# Patient Record
Sex: FEMALE | Race: BLACK OR AFRICAN AMERICAN | Employment: UNEMPLOYED | URBAN - METROPOLITAN AREA
[De-identification: names, ages, dates, MRNs, and addresses within clinical notes are randomized per-mention and may not be internally consistent; named-entity substitution may affect disease eponyms.]

---

## 2017-08-25 ENCOUNTER — TELEPHONE (OUTPATIENT)
Dept: ENDOCRINOLOGY | Facility: CLINIC | Age: 52
End: 2017-08-25

## 2017-08-25 NOTE — TELEPHONE ENCOUNTER
Received refill request for topiramate (TOPAMAX) 25 MG tablet   . Patient needs appointment scheduled prior to any refills. Clinic RN Care Coordinator notified and will follow up with the patient as appropriate. The pharmacy has been notified that the medication will not be refilled prior to an appointment being scheduled.    [facility]  #[id number]  [place]

## 2018-08-10 ENCOUNTER — EXTERNAL ORDER RESULTS (OUTPATIENT)
Dept: CARE COORDINATION | Facility: CLINIC | Age: 53
End: 2018-08-10

## 2018-12-03 ENCOUNTER — OFFICE VISIT (OUTPATIENT)
Dept: ENDOCRINOLOGY | Facility: CLINIC | Age: 53
End: 2018-12-03
Payer: MEDICARE

## 2018-12-03 VITALS
TEMPERATURE: 98.7 F | HEART RATE: 96 BPM | WEIGHT: 238.2 LBS | HEIGHT: 63 IN | OXYGEN SATURATION: 95 % | DIASTOLIC BLOOD PRESSURE: 98 MMHG | BODY MASS INDEX: 42.21 KG/M2 | SYSTOLIC BLOOD PRESSURE: 154 MMHG | RESPIRATION RATE: 18 BRPM

## 2018-12-03 DIAGNOSIS — E66.01 MORBID OBESITY DUE TO EXCESS CALORIES (H): ICD-10-CM

## 2018-12-03 RX ORDER — PHENTERMINE HYDROCHLORIDE 15 MG/1
15 CAPSULE ORAL EVERY MORNING
Qty: 30 CAPSULE | Refills: 5 | Status: SHIPPED | OUTPATIENT
Start: 2018-12-03 | End: 2020-11-02

## 2018-12-03 RX ORDER — TOPIRAMATE 25 MG/1
TABLET, FILM COATED ORAL
Qty: 90 TABLET | Refills: 5 | Status: SHIPPED | OUTPATIENT
Start: 2018-12-03 | End: 2020-11-02

## 2018-12-03 NOTE — PROGRESS NOTES
"    Return Medical Weight Management Note     Hillary Arceo  MRN:  [id number]  :  1965  FELIBERTO:  2015    Dear Dr. Hewitt,     I had the pleasure of seeing your patient Hillary Arceo.  She is a 53 year old female who I am continuing to see for treatment of obesity related to:DM-2, Hypertension, Hyperlipidemia, Sleep Apnea (has CPAP and does not use), Back Pain, Knee Pain and Depression.    CURRENT WEIGHT:   238 lbs 3.2 oz    Wt Readings from Last 4 Encounters:   18 108 kg (238 lb 3.2 oz)   12/18/15 112.4 kg (247 lb 14.4 oz)   14 110.5 kg (243 lb 11.2 oz)   10/23/14 99.3 kg (219 lb)       Height:  5' 3\"  Body Mass Index:  Body mass index is 42.2 kg/(m^2).  Vitals:  B/P: 121/80, P: 98, R: 19     Initial consult weight was 240 on 14.  Weight change since last seen on 12/18/15 is down 9 pounds.   Total loss is 2 pounds.    INTERVAL HISTORY:  Has been off meds and food program for 2+ years, wants to restart.    Diet and Activity Changes Since Last Visit Reviewed With Patient 12/3/2018   I have made the following changes to my diet since my last visit: I DO NOT EAT ANY PORK   With regards to my diet, I am still struggling with: SOME POTATO CHIPS   For breakfast, I typically eat: -   For lunch, I typically eat: -   For supper, I typically eat: -   For snack(s), I typically eat: -   I have made the following changes to my activity/exercise since my last visit: I WALK AT LEAST 2 MILES 3 TIMES A WEEK   With regards to my activity/exercise, I am still struggling with: STAYING FOCUSED ON WALKING AND EXERCISING       ROS    MEDICATIONS:   Current Outpatient Prescriptions   Medication     albuterol (PROAIR HFA, PROVENTIL HFA, VENTOLIN HFA) 108 (90 BASE) MCG/ACT inhaler     ATORVASTATIN CALCIUM PO     benzonatate (TESSALON) 100 MG capsule     Cyclobenzaprine HCl (FLEXERIL PO)     FLUoxetine HCl (PROZAC PO)     gabapentin enacarbil (HORIZANT) 600 MG tablet     GLIMEPIRIDE PO     " lisinopril-hydrochlorothiazide (PRINZIDE,ZESTORETIC) 20-12.5 MG per tablet     metFORMIN (GLUCOPHAGE) 500 MG/5ML SOLN     METHADONE HCL PO     phentermine (ADIPEX-P) 37.5 MG tablet     topiramate (TOPAMAX) 25 MG tablet     No current facility-administered medications for this visit.        Weight Loss Medication History Reviewed With Patient 12/3/2018   Which weight loss medications are you currently taking on a regular basis?  None   If you are not taking a weight loss medication that was prescribed to you, please indicate why: It did not seem to be helping me   Are you having any side effects from the weight loss medication that we have prescribed you? No   If you are having side effects please describe: NONE     Obesity Medications: Phentermine. Topiramate    Side-effects: None.Blood pressure and cardiac status are acceptable.    Assessment/plan: Reinforce food goals, restart phentermine 15/d, restart topiramate ramp up to 75 HS.     RTC:    12 weeks.  10/15 minutes spent on counseling and education     Sincerely,    Amador Gooden MD

## 2018-12-03 NOTE — NURSING NOTE
"Chief Complaint   Patient presents with     Weight Problem     pt here for weight management follow up       Vitals:    12/03/18 0831 12/03/18 0836   BP: (!) 176/106 (!) 154/98   BP Location: Left arm    Patient Position: Sitting    Cuff Size: Adult Regular    Pulse: 96    Resp: 18    Temp: 98.7  F (37.1  C)    TempSrc: Oral    SpO2: 95%    Weight: 238 lb 3.2 oz    Height: 5' 3\"        Body mass index is 42.2 kg/(m^2).      AUSTEN Cordero, EMT                      "

## 2018-12-03 NOTE — MR AVS SNAPSHOT
"              After Visit Summary   12/3/2018    Hillary Arceo    MRN: [id number]           Patient Information     Date Of Birth          1965        Visit Information        Provider Department      12/3/2018 8:15 AM Amador Gooden MD  Health Medical Weight Management        Today's Diagnoses     Morbid obesity due to excess calories (H)           Follow-ups after your visit        Follow-up notes from your care team     Return in about 4 months (around 4/3/2019).      Who to contact     Please call your clinic at [phone] to:    Ask questions about your health    Make or cancel appointments    Discuss your medicines    Learn about your test results    Speak to your doctor            Additional Information About Your Visit        MyChart Information     [employer] is an electronic gateway that provides easy, online access to your medical records. With [facility], you can request a clinic appointment, read your test results, renew a prescription or communicate with your care team.     To sign up for [employer] visit the website at [email or web address]   You will be asked to enter the access code listed below, as well as some personal information. Please follow the directions to create your username and password.     Your access code is: [email or web address]  Expires: 2019  6:30 AM     Your access code will  in 90 days. If you need help or a new code, please contact your [facility] or call [phone] for assistance.        Care EveryWhere ID     This is your Care EveryWhere ID. This could be used by other organizations to access your [facility] medical records  [id number]        Your Vitals Were     Pulse Temperature Respirations Height Pulse Oximetry BMI (Body Mass Index)    96 98.7  F (37.1  C) (Oral) 18 1.6 m (5' 3\") 95% 42.2 kg/m2       Blood Pressure from Last 3 Encounters:   18 (!) 154/98   12/18/15 121/80   14 119/79    Weight from Last 3 " Encounters:   12/03/18 108 kg (238 lb 3.2 oz)   12/18/15 112.4 kg (247 lb 14.4 oz)   12/12/14 110.5 kg (243 lb 11.2 oz)              Today, you had the following     No orders found for display         Today's Medication Changes          These changes are accurate as of 12/3/18  8:58 AM.  If you have any questions, ask your nurse or doctor.               Start taking these medicines.        Dose/Directions    phentermine 15 MG capsule   Commonly known as:  ADIPEX-P   Used for:  Morbid obesity due to excess calories (H)   Replaces:  phentermine 37.5 MG tablet   Started by:  Amador Gooden MD        Dose:  15 mg   Take 1 capsule (15 mg) by mouth every morning   Quantity:  30 capsule   Refills:  5         These medicines have changed or have updated prescriptions.        Dose/Directions    topiramate 25 MG tablet   Commonly known as:  TOPAMAX   This may have changed:    - how much to take  - how to take this  - when to take this  - additional instructions   Used for:  Morbid obesity due to excess calories (H)   Changed by:  Amador Gooden MD        25 mg at bedtime for 1 week, 50 mg at bedtime for 1 week and 75 mg daily at bedtime thereafter   Quantity:  90 tablet   Refills:  5         Stop taking these medicines if you haven't already. Please contact your care team if you have questions.     phentermine 37.5 MG tablet   Commonly known as:  ADIPEX-P   Replaced by:  phentermine 15 MG capsule   Stopped by:  Amador Gooden MD                Where to get your medicines      These medications were sent to [facility] #[id number] - [place] - [address] [place] [address] [place] [address]     Phone:  [phone]     topiramate 25 MG tablet         Some of these will need a paper prescription and others can be bought over the counter.  Ask your nurse if you have questions.     Bring a paper prescription for each of these medications     phentermine 15 MG capsule                Primary  Care Provider Office Phone # Fax #    Anjali Hewitt MD, [phone]       [facility] [address]  [facility] [address]        Equal Access to Services     DAVID WARE : Anam patel fidel cheyanneo Sobryan, waaxda luqadaha, qaybta kaalmada adeegyada, el franco laLilliamfrench espinal. So [facility] [phone].    ATENCIÓN: Si habla español, tiene a levy disposición servicios gratuitos de asistencia lingüística. Llame al [phone].    We comply with applicable federal civil rights laws and [place] laws. We do not discriminate on the basis of race, color, national origin, age, disability, sex, sexual orientation, or gender identity.            Thank you!     Thank you for choosing [facility]  for your care. Our goal is always to provide you with excellent care. Hearing back from our patients is one way we can continue to improve our services. Please take a few minutes to complete the written survey that you may receive in the mail after your visit with us. Thank you!             Your Updated Medication List - Protect others around you: Learn how to safely use, store and throw away your medicines at www.disposemymeds.org.          This list is accurate as of 12/3/18  8:58 AM.  Always use your most recent med list.                   Brand Name Dispense Instructions for use Diagnosis    albuterol 108 (90 Base) MCG/ACT inhaler    PROAIR HFA/PROVENTIL HFA/VENTOLIN HFA     Inhale 2 puffs into the lungs every 6 hours        ATORVASTATIN CALCIUM PO      Take 20 mg by mouth daily        benzonatate 100 MG capsule    TESSALON    25 capsule    Take 1 capsule (100 mg) by mouth 3 times daily as needed for cough        FLEXERIL PO      Take 10 mg by mouth 3 times daily        gabapentin enacarbil 600 MG tablet    HORIZANT     Take 600 mg by mouth 3 times daily Do not split, crush or chew tablets.        GLIMEPIRIDE PO      Take 4 mg by mouth daily         lisinopril-hydrochlorothiazide 20-12.5 MG tablet    PRINZIDE/ZESTORETIC     Take 1 tablet by mouth daily        metFORMIN 500 MG/5ML Soln solution    GLUCOPHAGE     Take 1,000 mg by mouth 2 times daily        METHADONE HCL PO      Take 48 mg by mouth        phentermine 15 MG capsule    ADIPEX-P    30 capsule    Take 1 capsule (15 mg) by mouth every morning    Morbid obesity due to excess calories (H)       PROZAC PO      Take 40 mg by mouth daily        topiramate 25 MG tablet    TOPAMAX    90 tablet    25 mg at bedtime for 1 week, 50 mg at bedtime for 1 week and 75 mg daily at bedtime thereafter    Morbid obesity due to excess calories (H)

## 2018-12-03 NOTE — LETTER
"12/3/2018       RE: Hillary Arceo  [address]  [place] [address]     Dear Colleague,    Thank you for referring your patient, Hillary Arceo, to the [facility] at [facility]. Please see a copy of my visit note below.        Return Medical Weight Management Note     Hillary Arceo  MRN:  [id number]  :  1965  FELIBERTO:  2015    Dear Dr. Hewitt,     I had the pleasure of seeing your patient Hillary Arceo.  She is a 53 year old female who I am continuing to see for treatment of obesity related to:DM-2, Hypertension, Hyperlipidemia, Sleep Apnea (has CPAP and does not use), Back Pain, Knee Pain and Depression.    CURRENT WEIGHT:   238 lbs 3.2 oz    Wt Readings from Last 4 Encounters:   18 108 kg (238 lb 3.2 oz)   12/18/15 112.4 kg (247 lb 14.4 oz)   14 110.5 kg (243 lb 11.2 oz)   10/23/14 99.3 kg (219 lb)       Height:  5' 3\"  Body Mass Index:  Body mass index is 42.2 kg/(m^2).  Vitals:  B/P: 121/80, P: 98, R: 19     Initial consult weight was 240 on 14.  Weight change since last seen on 12/18/15 is down 9 pounds.   Total loss is 2 pounds.    INTERVAL HISTORY:  Has been off meds and food program for 2+ years, wants to restart.    Diet and Activity Changes Since Last Visit Reviewed With Patient 12/3/2018   I have made the following changes to my diet since my last visit: I DO NOT EAT ANY PORK   With regards to my diet, I am still struggling with: SOME POTATO CHIPS   For breakfast, I typically eat: -   For lunch, I typically eat: -   For supper, I typically eat: -   For snack(s), I typically eat: -   I have made the following changes to my activity/exercise since my last visit: I WALK AT LEAST 2 MILES 3 TIMES A WEEK   With regards to my activity/exercise, I am still struggling with: STAYING FOCUSED ON WALKING AND EXERCISING       ROS    MEDICATIONS:   Current Outpatient Prescriptions   Medication     albuterol (PROAIR HFA, " PROVENTIL HFA, VENTOLIN HFA) 108 (90 BASE) MCG/ACT inhaler     ATORVASTATIN CALCIUM PO     benzonatate (TESSALON) 100 MG capsule     Cyclobenzaprine HCl (FLEXERIL PO)     FLUoxetine HCl (PROZAC PO)     gabapentin enacarbil (HORIZANT) 600 MG tablet     GLIMEPIRIDE PO     lisinopril-hydrochlorothiazide (PRINZIDE,ZESTORETIC) 20-12.5 MG per tablet     metFORMIN (GLUCOPHAGE) 500 MG/5ML SOLN     METHADONE HCL PO     phentermine (ADIPEX-P) 37.5 MG tablet     topiramate (TOPAMAX) 25 MG tablet     No current facility-administered medications for this visit.        Weight Loss Medication History Reviewed With Patient 12/3/2018   Which weight loss medications are you currently taking on a regular basis?  None   If you are not taking a weight loss medication that was prescribed to you, please indicate why: It did not seem to be helping me   Are you having any side effects from the weight loss medication that we have prescribed you? No   If you are having side effects please describe: NONE     Obesity Medications: Phentermine. Topiramate    Side-effects: None.Blood pressure and cardiac status are acceptable.    Assessment/plan: Reinforce food goals, restart phentermine 15/d, restart topiramate ramp up to 75 HS.     RTC:    12 weeks.  10/15 minutes spent on counseling and education         Amador Gooden MD

## 2019-03-05 DIAGNOSIS — E66.01 MORBID OBESITY DUE TO EXCESS CALORIES (H): ICD-10-CM

## 2019-03-06 RX ORDER — TOPIRAMATE 25 MG/1
TABLET, FILM COATED ORAL
Qty: 218 TABLET | Refills: 4 | OUTPATIENT
Start: 2019-03-06

## 2019-11-01 ENCOUNTER — RECORDS - HEALTHEAST (OUTPATIENT)
Dept: LAB | Facility: CLINIC | Age: 54
End: 2019-11-01

## 2019-11-04 LAB
ANION GAP SERPL CALCULATED.3IONS-SCNC: 8 MMOL/L (ref 5–18)
BUN SERPL-MCNC: 14 MG/DL (ref 8–22)
CALCIUM SERPL-MCNC: 9.7 MG/DL (ref 8.5–10.5)
CHLORIDE BLD-SCNC: 104 MMOL/L (ref 98–107)
CO2 SERPL-SCNC: 29 MMOL/L (ref 22–31)
CREAT SERPL-MCNC: 0.7 MG/DL (ref 0.6–1.1)
ERYTHROCYTE [DISTWIDTH] IN BLOOD BY AUTOMATED COUNT: 13.8 % (ref 11–14.5)
GAMMA INTERFERON BACKGROUND BLD IA-ACNC: 0.06 IU/ML
GFR SERPL CREATININE-BSD FRML MDRD: >60 ML/MIN/1.73M2
GLUCOSE BLD-MCNC: 73 MG/DL (ref 70–125)
HCT VFR BLD AUTO: 34.5 % (ref 35–47)
HGB BLD-MCNC: 10.5 G/DL (ref 12–16)
M TB IFN-G BLD-IMP: NEGATIVE
MCH RBC QN AUTO: 26.7 PG (ref 27–34)
MCHC RBC AUTO-ENTMCNC: 30.4 G/DL (ref 32–36)
MCV RBC AUTO: 88 FL (ref 80–100)
MITOGEN IGNF BCKGRD COR BLD-ACNC: 0 IU/ML
MITOGEN IGNF BCKGRD COR BLD-ACNC: 0 IU/ML
PLATELET # BLD AUTO: 255 THOU/UL (ref 140–440)
PMV BLD AUTO: 10 FL (ref 8.5–12.5)
POTASSIUM BLD-SCNC: 3.5 MMOL/L (ref 3.5–5)
QTF INTERPRETATION: NORMAL
QTF MITOGEN - NIL: 9.78 IU/ML
RBC # BLD AUTO: 3.93 MILL/UL (ref 3.8–5.4)
SODIUM SERPL-SCNC: 141 MMOL/L (ref 136–145)
WBC: 6 THOU/UL (ref 4–11)

## 2019-11-24 ENCOUNTER — RECORDS - HEALTHEAST (OUTPATIENT)
Dept: LAB | Facility: CLINIC | Age: 54
End: 2019-11-24

## 2019-11-25 LAB
ANION GAP SERPL CALCULATED.3IONS-SCNC: 9 MMOL/L (ref 5–18)
BUN SERPL-MCNC: 14 MG/DL (ref 8–22)
CALCIUM SERPL-MCNC: 9.5 MG/DL (ref 8.5–10.5)
CHLORIDE BLD-SCNC: 104 MMOL/L (ref 98–107)
CO2 SERPL-SCNC: 29 MMOL/L (ref 22–31)
CREAT SERPL-MCNC: 0.71 MG/DL (ref 0.6–1.1)
ERYTHROCYTE [DISTWIDTH] IN BLOOD BY AUTOMATED COUNT: 13.4 % (ref 11–14.5)
GFR SERPL CREATININE-BSD FRML MDRD: >60 ML/MIN/1.73M2
GLUCOSE BLD-MCNC: 77 MG/DL (ref 70–125)
HCT VFR BLD AUTO: 35 % (ref 35–47)
HGB BLD-MCNC: 10.7 G/DL (ref 12–16)
MCH RBC QN AUTO: 26.6 PG (ref 27–34)
MCHC RBC AUTO-ENTMCNC: 30.6 G/DL (ref 32–36)
MCV RBC AUTO: 87 FL (ref 80–100)
PLATELET # BLD AUTO: 280 THOU/UL (ref 140–440)
PMV BLD AUTO: 10 FL (ref 8.5–12.5)
POTASSIUM BLD-SCNC: 3.7 MMOL/L (ref 3.5–5)
RBC # BLD AUTO: 4.03 MILL/UL (ref 3.8–5.4)
SODIUM SERPL-SCNC: 142 MMOL/L (ref 136–145)
WBC: 5.7 THOU/UL (ref 4–11)

## 2019-12-19 ENCOUNTER — RECORDS - HEALTHEAST (OUTPATIENT)
Dept: LAB | Facility: CLINIC | Age: 54
End: 2019-12-19

## 2019-12-20 LAB
ANION GAP SERPL CALCULATED.3IONS-SCNC: 6 MMOL/L (ref 5–18)
BUN SERPL-MCNC: 19 MG/DL (ref 8–22)
CALCIUM SERPL-MCNC: 10.1 MG/DL (ref 8.5–10.5)
CHLORIDE BLD-SCNC: 101 MMOL/L (ref 98–107)
CO2 SERPL-SCNC: 33 MMOL/L (ref 22–31)
CREAT SERPL-MCNC: 0.74 MG/DL (ref 0.6–1.1)
GFR SERPL CREATININE-BSD FRML MDRD: >60 ML/MIN/1.73M2
GLUCOSE BLD-MCNC: 73 MG/DL (ref 70–125)
POTASSIUM BLD-SCNC: 4 MMOL/L (ref 3.5–5)
SODIUM SERPL-SCNC: 140 MMOL/L (ref 136–145)

## 2020-10-24 DIAGNOSIS — E66.01 MORBID OBESITY DUE TO EXCESS CALORIES (H): ICD-10-CM

## 2020-10-28 ENCOUNTER — TELEPHONE (OUTPATIENT)
Dept: ENDOCRINOLOGY | Facility: CLINIC | Age: 55
End: 2020-10-28

## 2020-10-28 RX ORDER — TOPIRAMATE 25 MG/1
TABLET, FILM COATED ORAL
Qty: 90 TABLET | Refills: 5 | OUTPATIENT
Start: 2020-10-28

## 2020-10-28 NOTE — TELEPHONE ENCOUNTER
Refill request from pharmacy for Topiramate. Refill denied. Patient has not been seen since 2018. Message to clinic coordinators to schedule patient for virtual visit with Dr. Gooden to discuss medications.

## 2020-11-02 ENCOUNTER — VIRTUAL VISIT (OUTPATIENT)
Dept: ENDOCRINOLOGY | Facility: CLINIC | Age: 55
End: 2020-11-02
Payer: COMMERCIAL

## 2020-11-02 VITALS — WEIGHT: 232 LBS | BODY MASS INDEX: 41.11 KG/M2 | HEIGHT: 63 IN

## 2020-11-02 DIAGNOSIS — E66.01 MORBID OBESITY DUE TO EXCESS CALORIES (H): ICD-10-CM

## 2020-11-02 DIAGNOSIS — E66.01 MORBID OBESITY (H): ICD-10-CM

## 2020-11-02 PROCEDURE — 99213 OFFICE O/P EST LOW 20 MIN: CPT | Mod: 95 | Performed by: INTERNAL MEDICINE

## 2020-11-02 RX ORDER — TOPIRAMATE 25 MG/1
TABLET, FILM COATED ORAL
Qty: 90 TABLET | Refills: 5 | Status: SHIPPED | OUTPATIENT
Start: 2020-11-02 | End: 2020-11-06

## 2020-11-02 RX ORDER — PHENTERMINE HYDROCHLORIDE 15 MG/1
15 CAPSULE ORAL EVERY MORNING
Qty: 30 CAPSULE | Refills: 5 | Status: SHIPPED | OUTPATIENT
Start: 2020-11-02 | End: 2021-04-12

## 2020-11-02 ASSESSMENT — MIFFLIN-ST. JEOR: SCORE: 1616.48

## 2020-11-02 ASSESSMENT — PAIN SCALES - GENERAL: PAINLEVEL: EXTREME PAIN (9)

## 2020-11-02 NOTE — PROGRESS NOTES
"Hillary Arceo is a 55 year old female who is being evaluated via a billable video visit.      The patient has been notified of following:     \"This video visit will be conducted via a call between you and your physician/provider. We have found that certain health care needs can be provided without the need for an in-person physical exam.  This service lets us provide the care you need with a video conversation.  If a prescription is necessary we can send it directly to your pharmacy.  If lab work is needed we can place an order for that and you can then stop by our lab to have the test done at a later time.    Video visits are billed at different rates depending on your insurance coverage.  Please reach out to your insurance provider with any questions.    If during the course of the call the physician/provider feels a video visit is not appropriate, you will not be charged for this service.\"    Patient has given verbal consent for Video visit? Yes  How would you like to obtain your AVS? MyChart  If you are dropped from the video visit, the video invite should be resent to: Text to cell phone: [phone]  Will anyone else be joining your video visit? No  {If patient encounters technical issues they should call [phone] :[id number]}    During this virtual visit the patient is located in [place], patient verifies this as the location during the entirety of this visit.     Video-Visit Details    Type of service:  Video Visit    Video Start Time: {video visit start/end time:152948}  Video End Time: {video visit start/end time:152948}    Originating Location (pt. Location): {video visit patient location:933620::\"Home\"}    Distant Location (provider location):  [facility] [place]     Platform used for Video Visit: {Virtual Visit Platforms:602712::\"Fantoo\"}    {signature options:760202}      "

## 2020-11-02 NOTE — NURSING NOTE
"Chief Complaint   Patient presents with     Follow Up     [facility] follow up       Vitals:    11/02/20 1002   Weight: 105.2 kg (232 lb)   Height: 1.6 m (5' 3\")       Body mass index is 41.1 kg/m .                            "

## 2020-11-02 NOTE — LETTER
"2020       RE: Hillary Arceo  [address]  [place] [address]     Dear Colleague,    Thank you for referring your patient, Hillary Arceo, to the [facility] [place] at [facility]. Please see a copy of my visit note below.        Return Medical Weight Management Note     Hillary Arceo  MRN:  [id number]  :  1965  FELIBERTO:  20    Dear Dr. Hewitt,     I had the pleasure of seeing your patient Hillary Arceo.  She is a 53 year old female who I am continuing to see for treatment of obesity related to: DM-2, Hypertension, Hyperlipidemia, Sleep Apnea (has CPAP and does not use), Back Pain, Knee Pain and Depression.    CURRENT WEIGHT:   232 lbs 0 oz    Wt Readings from Last 4 Encounters:   20 105.2 kg (232 lb)   18 108 kg (238 lb 3.2 oz)   12/18/15 112.4 kg (247 lb 14.4 oz)   14 110.5 kg (243 lb 11.2 oz)     Height:  5' 3\"  Body Mass Index:  Body mass index is 41.1 kg/m .  Vitals:  B/P: 121/80, P: 98, R: 19     Initial consult weight was 240 on 14.  Weight change since last seen on 18 is down 6 pounds.   Total loss is 8 pounds.    INTERVAL HISTORY:  Has been off meds and food program for 2+ years, wants to restart.    No flowsheet data found.    MEDICATIONS:   Current Outpatient Medications   Medication     albuterol (PROAIR HFA, PROVENTIL HFA, VENTOLIN HFA) 108 (90 BASE) MCG/ACT inhaler     ATORVASTATIN CALCIUM PO     benzonatate (TESSALON) 100 MG capsule     Cyclobenzaprine HCl (FLEXERIL PO)     gabapentin enacarbil (HORIZANT) 600 MG tablet     GLIMEPIRIDE PO     lisinopril-hydrochlorothiazide (PRINZIDE,ZESTORETIC) 20-12.5 MG per tablet     metFORMIN (GLUCOPHAGE) 500 MG/5ML SOLN     METHADONE HCL PO     topiramate (TOPAMAX) 25 MG tablet     FLUoxetine HCl (PROZAC PO)     phentermine (ADIPEX-P) 15 MG capsule     No current facility-administered medications for this visit.        Weight Loss " "Medication History Reviewed With Patient 11/2/2020   Which weight loss medications are you currently taking on a regular basis?  Topamax (topiramate)   If you are not taking a weight loss medication that was prescribed to you, please indicate why: -   Are you having any side effects from the weight loss medication that we have prescribed you? No   If you are having side effects please describe: -     Obesity Medications: Phentermine. Topiramate    Side-effects: None. Blood pressure and cardiac status are acceptable.    Assessment/plan: Reinforce food goals, restart phentermine 15/d, restart topiramate ramp up to 75 HS.     RTC:    12 weeks.  Phone call duration: 15 minutes.  I explained the conditions and plans (more than 50% of time was counseling/coordination of weight management).    Sincerely,  Amador Gooden MD     The patient was evaluated via a billable telephone visit and notified:  \"This visit will be via telephone call between you and your physician. If lab work is needed we can place an order and you can later stop by the lab. Telephone visits are billed at different rates depending on your insurance coverage. During this emergency period, some insurers may be billed the same as an in-person visit.  Please check with your insurance provider with any questions. If the physician feels a telephone visit is not appropriate, you will not be charged for this service.\" Patient gave verbal consent for telephone visit and would you like to obtain AVS by [employer].      "

## 2020-11-02 NOTE — PROGRESS NOTES
"    Return Medical Weight Management Note     Hillary Arceo  MRN:  [id number]  :  1965  FELIBERTO:  20    Dear Dr. Hewitt,     I had the pleasure of seeing your patient Hillary Arceo.  She is a 53 year old female who I am continuing to see for treatment of obesity related to: DM-2, Hypertension, Hyperlipidemia, Sleep Apnea (has CPAP and does not use), Back Pain, Knee Pain and Depression.    CURRENT WEIGHT:   232 lbs 0 oz    Wt Readings from Last 4 Encounters:   20 105.2 kg (232 lb)   18 108 kg (238 lb 3.2 oz)   12/18/15 112.4 kg (247 lb 14.4 oz)   14 110.5 kg (243 lb 11.2 oz)     Height:  5' 3\"  Body Mass Index:  Body mass index is 41.1 kg/m .  Vitals:  B/P: 121/80, P: 98, R: 19     Initial consult weight was 240 on 14.  Weight change since last seen on 18 is down 6 pounds.   Total loss is 8 pounds.    INTERVAL HISTORY:  Has been off meds and food program for 2+ years, wants to restart.    No flowsheet data found.    MEDICATIONS:   Current Outpatient Medications   Medication     albuterol (PROAIR HFA, PROVENTIL HFA, VENTOLIN HFA) 108 (90 BASE) MCG/ACT inhaler     ATORVASTATIN CALCIUM PO     benzonatate (TESSALON) 100 MG capsule     Cyclobenzaprine HCl (FLEXERIL PO)     gabapentin enacarbil (HORIZANT) 600 MG tablet     GLIMEPIRIDE PO     lisinopril-hydrochlorothiazide (PRINZIDE,ZESTORETIC) 20-12.5 MG per tablet     metFORMIN (GLUCOPHAGE) 500 MG/5ML SOLN     METHADONE HCL PO     topiramate (TOPAMAX) 25 MG tablet     FLUoxetine HCl (PROZAC PO)     phentermine (ADIPEX-P) 15 MG capsule     No current facility-administered medications for this visit.        Weight Loss Medication History Reviewed With Patient 2020   Which weight loss medications are you currently taking on a regular basis?  Topamax (topiramate)   If you are not taking a weight loss medication that was prescribed to you, please indicate why: -   Are you having any side effects from the weight loss medication that " "we have prescribed you? No   If you are having side effects please describe: -     Obesity Medications: Phentermine. Topiramate    Side-effects: None. Blood pressure and cardiac status are acceptable.    Assessment/plan: Reinforce food goals, restart phentermine 15/d, restart topiramate ramp up to 75 HS.     RTC:    12 weeks.  Phone call duration: 15 minutes.  I explained the conditions and plans (more than 50% of time was counseling/coordination of weight management).    Sincerely,  Amador Gooden MD     The patient was evaluated via a billable telephone visit and notified:  \"This visit will be via telephone call between you and your physician. If lab work is needed we can place an order and you can later stop by the lab. Telephone visits are billed at different rates depending on your insurance coverage. During this emergency period, some insurers may be billed the same as an in-person visit.  Please check with your insurance provider with any questions. If the physician feels a telephone visit is not appropriate, you will not be charged for this service.\" Patient gave verbal consent for telephone visit and would you like to obtain AVS by [employer].      "

## 2020-11-06 RX ORDER — TOPIRAMATE 25 MG/1
TABLET, FILM COATED ORAL
Qty: 270 TABLET | Refills: 1 | Status: SHIPPED | OUTPATIENT
Start: 2020-11-06 | End: 2021-04-12

## 2020-11-06 NOTE — TELEPHONE ENCOUNTER
TOPIRAMATE 25MG TABLETS      Last Written Prescription Date:  11-2-20  Last Fill Quantity: 90,   # refills: 5  Last Office Visit : 11-2-20  Future Office visit:  none    Routing refill request to provider for review/approval because:  Pt reuesting 90 day

## 2020-11-29 ENCOUNTER — HEALTH MAINTENANCE LETTER (OUTPATIENT)
Age: 55
End: 2020-11-29

## 2021-03-03 ENCOUNTER — TRANSFERRED RECORDS (OUTPATIENT)
Dept: MULTI SPECIALTY CLINIC | Facility: CLINIC | Age: 56
End: 2021-03-03
Payer: COMMERCIAL

## 2021-03-03 LAB — HBA1C MFR BLD: 7.7 % (ref 4–6)

## 2021-04-02 ENCOUNTER — TELEPHONE (OUTPATIENT)
Dept: ENDOCRINOLOGY | Facility: CLINIC | Age: 56
End: 2021-04-02

## 2021-04-02 DIAGNOSIS — E66.01 MORBID OBESITY DUE TO EXCESS CALORIES (H): ICD-10-CM

## 2021-04-02 RX ORDER — TOPIRAMATE 25 MG/1
TABLET, FILM COATED ORAL
Qty: 218 TABLET | Status: CANCELLED | OUTPATIENT
Start: 2021-04-02

## 2021-04-06 RX ORDER — TOPIRAMATE 25 MG/1
TABLET, FILM COATED ORAL
Qty: 90 TABLET | OUTPATIENT
Start: 2021-04-06

## 2021-04-06 NOTE — TELEPHONE ENCOUNTER
Refill denied at this time. Patient needs appointment. [facility] message sent to patient to schedule a virtual visit with Dr. Gooden.

## 2021-04-06 NOTE — TELEPHONE ENCOUNTER
TOPIRAMATE 25MG TABLETS  TAKE 1 TABLET BY MOUTH AT BEDTIME FOR 7 DAYS, THEN 2 TABLETS AT BEDTIME FOR 7 DAYS, THEN 3 TABLETS AT BEDTIME THEREAFTER      Last Written Prescription Date:  11/6/20  Last Fill Quantity: 270,   # refills: 1   Last Office Visit : 11-2-20  Future Office visit:  none/ 12 weeks    Received refill request for  TOPIRAMATE 25MG TABLETS. Patient needs appointment scheduled prior to any refills.   Clinic Coordinator notified and will follow up with the patient as appropriate. The pharmacy has been notified that the medication will not be refilled prior to an appointment being scheduled.

## 2021-04-12 ENCOUNTER — VIRTUAL VISIT (OUTPATIENT)
Dept: ENDOCRINOLOGY | Facility: CLINIC | Age: 56
End: 2021-04-12
Payer: COMMERCIAL

## 2021-04-12 VITALS — HEIGHT: 63 IN | BODY MASS INDEX: 43.41 KG/M2 | WEIGHT: 245 LBS

## 2021-04-12 DIAGNOSIS — E66.01 MORBID OBESITY DUE TO EXCESS CALORIES (H): ICD-10-CM

## 2021-04-12 PROBLEM — E27.8 ADRENAL MASS (H): Status: ACTIVE | Noted: 2019-09-18

## 2021-04-12 PROBLEM — T14.8XXA PUNCTURE WOUND: Status: ACTIVE | Noted: 2019-09-03

## 2021-04-12 PROBLEM — R68.89 ABNORMAL FINDING: Status: ACTIVE | Noted: 2019-09-02

## 2021-04-12 PROBLEM — F11.21 MODERATE OPIOID USE DISORDER, IN SUSTAINED REMISSION (H): Status: ACTIVE | Noted: 2020-01-19

## 2021-04-12 PROBLEM — G82.22 INCOMPLETE PARAPLEGIA (H): Status: ACTIVE | Noted: 2020-01-19

## 2021-04-12 PROBLEM — K76.9 LIVER LESION: Status: ACTIVE | Noted: 2019-09-18

## 2021-04-12 PROBLEM — R40.20 LOC (LOSS OF CONSCIOUSNESS) (H): Status: ACTIVE | Noted: 2019-09-03
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
## 2021-04-12 PROBLEM — G44.019 EPISODIC CLUSTER HEADACHE, NOT INTRACTABLE: Status: ACTIVE | Noted: 2018-07-26

## 2021-04-12 PROBLEM — K59.2 NEUROGENIC BOWEL: Status: ACTIVE | Noted: 2019-09-18

## 2021-04-12 PROBLEM — F19.11 HISTORY OF SUBSTANCE ABUSE (H): Status: ACTIVE | Noted: 2020-01-19

## 2021-04-12 PROBLEM — N31.9 NEUROGENIC BLADDER: Status: ACTIVE | Noted: 2019-09-18

## 2021-04-12 PROBLEM — S24.109A: Status: ACTIVE | Noted: 2019-09-18

## 2021-04-12 PROBLEM — G62.9 NEUROPATHY: Status: ACTIVE | Noted: 2020-01-19

## 2021-04-12 PROBLEM — S00.03XA SCALP HEMATOMA: Status: ACTIVE | Noted: 2019-09-03

## 2021-04-12 PROBLEM — G90.4 AUTONOMIC DYSREFLEXIA: Status: ACTIVE | Noted: 2019-10-04

## 2021-04-12 PROBLEM — G40.909 SEIZURE DISORDER (H): Status: ACTIVE | Noted: 2020-01-19

## 2021-04-12 PROBLEM — R07.9 CHEST PAIN OF UNKNOWN ETIOLOGY: Status: ACTIVE | Noted: 2019-10-31

## 2021-04-12 PROBLEM — F11.90 METHADONE USE: Status: ACTIVE | Noted: 2017-09-12

## 2021-04-12 PROBLEM — S22.009A FRACTURE OF THORACIC SPINE (H): Status: ACTIVE | Noted: 2019-11-14

## 2021-04-12 PROBLEM — I26.99 BILATERAL PULMONARY EMBOLISM (H): Status: ACTIVE | Noted: 2020-01-18

## 2021-04-12 PROBLEM — Z78.9 HEALTH CARE HOME, ACTIVE CARE COORDINATION: Status: ACTIVE | Noted: 2020-08-28

## 2021-04-12 PROBLEM — T14.8XXA PARASPINAL HEMATOMA: Status: ACTIVE | Noted: 2019-09-03

## 2021-04-12 PROBLEM — S22.31XA RIGHT RIB FRACTURE: Status: ACTIVE | Noted: 2019-09-18

## 2021-04-12 PROBLEM — S22.41XA CLOSED FRACTURE OF MULTIPLE RIBS OF RIGHT SIDE: Status: ACTIVE | Noted: 2019-09-03

## 2021-04-12 PROCEDURE — 99212 OFFICE O/P EST SF 10 MIN: CPT | Mod: 95 | Performed by: INTERNAL MEDICINE

## 2021-04-12 RX ORDER — PHENTERMINE HYDROCHLORIDE 15 MG/1
30 CAPSULE ORAL EVERY MORNING
Qty: 60 CAPSULE | Refills: 5 | Status: SHIPPED | OUTPATIENT
Start: 2021-04-12

## 2021-04-12 RX ORDER — TOPIRAMATE 25 MG/1
50 TABLET, FILM COATED ORAL 2 TIMES DAILY
Qty: 360 TABLET | Refills: 4 | Status: SHIPPED | OUTPATIENT
Start: 2021-04-12

## 2021-04-12 ASSESSMENT — PAIN SCALES - GENERAL: PAINLEVEL: NO PAIN (0)

## 2021-04-12 ASSESSMENT — MIFFLIN-ST. JEOR: SCORE: 1675.44

## 2021-04-12 NOTE — PROGRESS NOTES
"    Return Medical Weight Management Note     Hillary Arceo  MRN:  [id number]  :  1965  FELIBERTO:  21    Dear Dr. Hewitt,     I had the pleasure of seeing your patient Hillary Arceo.  She is a 53 year old female who I am continuing to see for treatment of obesity related to: DM-2, Hypertension, Hyperlipidemia, Sleep Apnea (has CPAP and does not use), Back Pain, Knee Pain and Depression.    CURRENT WEIGHT:   245 lbs 0 oz    Wt Readings from Last 4 Encounters:   21 111.1 kg (245 lb)   20 105.2 kg (232 lb)   18 108 kg (238 lb 3.2 oz)   12/18/15 112.4 kg (247 lb 14.4 oz)     Height:  5' 3\"[per pt[  Body Mass Index:  Body mass index is 43.4 kg/m .  Vitals:  B/P: 121/80, P: 98, R: 19     Initial consult weight was 240 on 14.  Weight change since last seen on 20 is up 13 pounds.   Total gain is 5 pounds.    INTERVAL HISTORY:  Does not feel meds are helping this time, has changed food programt.    No flowsheet data found.    MEDICATIONS:   Current Outpatient Medications   Medication     albuterol (PROAIR HFA, PROVENTIL HFA, VENTOLIN HFA) 108 (90 BASE) MCG/ACT inhaler     ATORVASTATIN CALCIUM PO     benzonatate (TESSALON) 100 MG capsule     Cyclobenzaprine HCl (FLEXERIL PO)     FLUoxetine HCl (PROZAC PO)     gabapentin enacarbil (HORIZANT) 600 MG tablet     GLIMEPIRIDE PO     lisinopril-hydrochlorothiazide (PRINZIDE,ZESTORETIC) 20-12.5 MG per tablet     metFORMIN (GLUCOPHAGE) 500 MG/5ML SOLN     METHADONE HCL PO     topiramate (TOPAMAX) 25 MG tablet     phentermine (ADIPEX-P) 15 MG capsule     No current facility-administered medications for this visit.        Weight Loss Medication History Reviewed With Patient 2020   Which weight loss medications are you currently taking on a regular basis?  Topamax (topiramate)   If you are not taking a weight loss medication that was prescribed to you, please indicate why: -   Are you having any side effects from the weight loss medication " "that we have prescribed you? No   If you are having side effects please describe: -     Obesity Medications: Phentermine. Topiramate    Side-effects: None. Blood pressure and cardiac status are acceptable.    Assessment/plan: Reinforce food goals, increase phentermine 30/d, topiramate  to 50 BID.     RTC:    12 weeks.  Phone call duration: 15 minutes.  I explained the conditions and plans (more than 50% of time was counseling/coordination of weight management).    Sincerely,  Amador Gooden MD     The patient was evaluated via a billable telephone visit and notified:  \"This visit will be via telephone call between you and your physician. If lab work is needed we can place an order and you can later stop by the lab. Telephone visits are billed at different rates depending on your insurance coverage. During this emergency period, some insurers may be billed the same as an in-person visit.  Please check with your insurance provider with any questions. If the physician feels a telephone visit is not appropriate, you will not be charged for this service.\" Patient gave verbal consent for telephone visit and would you like to obtain AVS by [employer].      "

## 2021-04-12 NOTE — LETTER
"2021       RE: Hillary Arceo  [address]  [place] [address]     Dear Colleague,    Thank you for referring your patient, Hillary Arceo, to the [facility] [place] at [facility]. Please see a copy of my visit note below.        Return Medical Weight Management Note     Hillary Arceo  MRN:  [id number]  :  1965  FELIBERTO:  21    Dear Dr. Hewitt,     I had the pleasure of seeing your patient Hillary Arceo.  She is a 53 year old female who I am continuing to see for treatment of obesity related to: DM-2, Hypertension, Hyperlipidemia, Sleep Apnea (has CPAP and does not use), Back Pain, Knee Pain and Depression.    CURRENT WEIGHT:   245 lbs 0 oz    Wt Readings from Last 4 Encounters:   21 111.1 kg (245 lb)   20 105.2 kg (232 lb)   18 108 kg (238 lb 3.2 oz)   12/18/15 112.4 kg (247 lb 14.4 oz)     Height:  5' 3\"[per pt[  Body Mass Index:  Body mass index is 43.4 kg/m .  Vitals:  B/P: 121/80, P: 98, R: 19     Initial consult weight was 240 on 14.  Weight change since last seen on 20 is up 13 pounds.   Total gain is 5 pounds.    INTERVAL HISTORY:  Does not feel meds are helping this time, has changed food programt.    No flowsheet data found.    MEDICATIONS:   Current Outpatient Medications   Medication     albuterol (PROAIR HFA, PROVENTIL HFA, VENTOLIN HFA) 108 (90 BASE) MCG/ACT inhaler     ATORVASTATIN CALCIUM PO     benzonatate (TESSALON) 100 MG capsule     Cyclobenzaprine HCl (FLEXERIL PO)     FLUoxetine HCl (PROZAC PO)     gabapentin enacarbil (HORIZANT) 600 MG tablet     GLIMEPIRIDE PO     lisinopril-hydrochlorothiazide (PRINZIDE,ZESTORETIC) 20-12.5 MG per tablet     metFORMIN (GLUCOPHAGE) 500 MG/5ML SOLN     METHADONE HCL PO     topiramate (TOPAMAX) 25 MG tablet     phentermine (ADIPEX-P) 15 MG capsule     No current facility-administered medications for this visit.        Weight Loss " "Medication History Reviewed With Patient 11/2/2020   Which weight loss medications are you currently taking on a regular basis?  Topamax (topiramate)   If you are not taking a weight loss medication that was prescribed to you, please indicate why: -   Are you having any side effects from the weight loss medication that we have prescribed you? No   If you are having side effects please describe: -     Obesity Medications: Phentermine. Topiramate    Side-effects: None. Blood pressure and cardiac status are acceptable.    Assessment/plan: Reinforce food goals, increase phentermine 30/d, topiramate  to 50 BID.     RTC:    12 weeks.  Phone call duration: 15 minutes.  I explained the conditions and plans (more than 50% of time was counseling/coordination of weight management).    Sincerely,  Amador Gooden MD     The patient was evaluated via a billable telephone visit and notified:  \"This visit will be via telephone call between you and your physician. If lab work is needed we can place an order and you can later stop by the lab. Telephone visits are billed at different rates depending on your insurance coverage. During this emergency period, some insurers may be billed the same as an in-person visit.  Please check with your insurance provider with any questions. If the physician feels a telephone visit is not appropriate, you will not be charged for this service.\" Patient gave verbal consent for telephone visit and would you like to obtain AVS by [employer].      "

## 2021-04-12 NOTE — PROGRESS NOTES
"Hillary Arceo is a 55 year old female who is being evaluated via a billable video visit.      The patient has been notified of following:     \"This video visit will be conducted via a call between you and your physician/provider. We have found that certain health care needs can be provided without the need for an in-person physical exam.  This service lets us provide the care you need with a video conversation.  If a prescription is necessary we can send it directly to your pharmacy.  If lab work is needed we can place an order for that and you can then stop by our lab to have the test done at a later time.    If during the course of the call the physician/provider feels a video visit is not appropriate, you will not be charged for this service.\"     Patient confirmed that they are in [place] for today's visit     Video-Visit Details  Type of service:  Video Visit    Video Start Time: {video visit start time:152948}  Video End Time:  {video visit start time:152948}    Originating Location (pt. Location): {video visit patient location:145333::\"Home\"}    Distant Location (provider location):  [facility] [place]     Platform used: {Virtual Visit Platforms:768925::\"KONUX\"}            "
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
## 2021-05-30 ENCOUNTER — HEALTH MAINTENANCE LETTER (OUTPATIENT)
Age: 56
End: 2021-05-30

## 2021-05-30 ENCOUNTER — RECORDS - HEALTHEAST (OUTPATIENT)
Dept: ADMINISTRATIVE | Facility: CLINIC | Age: 56
End: 2021-05-30

## 2021-09-19 ENCOUNTER — HEALTH MAINTENANCE LETTER (OUTPATIENT)
Age: 56
End: 2021-09-19

## 2021-12-14 ENCOUNTER — DOCUMENTATION ONLY (OUTPATIENT)
Dept: ENDOCRINOLOGY | Facility: CLINIC | Age: 56
End: 2021-12-14
Payer: COMMERCIAL

## 2021-12-14 NOTE — PROGRESS NOTES
Chart reviewed by Ambulatory Quality and Data team    Please abstract the following data from this visit with this patient into the appropriate field in Epic:    Tests that can be patient reported without a hard copy:        Other Tests found in the patient's chart through Chart Review/Care Everywhere:    A1c done by this group [facility] on this date: 3/3/21    Note to Abstraction: If this section is blank, no results were found via Chart Review/Care Everywhere.

## 2022-01-09 ENCOUNTER — HEALTH MAINTENANCE LETTER (OUTPATIENT)
Age: 57
End: 2022-01-09

## 2022-05-01 ENCOUNTER — HEALTH MAINTENANCE LETTER (OUTPATIENT)
Age: 57
End: 2022-05-01

## 2022-08-21 ENCOUNTER — HEALTH MAINTENANCE LETTER (OUTPATIENT)
Age: 57
End: 2022-08-21

## 2022-11-21 ENCOUNTER — HEALTH MAINTENANCE LETTER (OUTPATIENT)
Age: 57
End: 2022-11-21

## 2022-12-17 ENCOUNTER — NURSE TRIAGE (OUTPATIENT)
Dept: NURSING | Facility: CLINIC | Age: 57
End: 2022-12-17

## 2022-12-17 NOTE — TELEPHONE ENCOUNTER
Pt was asking for a cab ride. She is calling from [facility]. This writer apologized to the patient, but informed her that she was given the wrong number and that she should ask for further information from staff at the ED.    Krysta Sanders RN [facility] 12/17/2022 12:06 AM

## 2022-12-25 ENCOUNTER — HEALTH MAINTENANCE LETTER (OUTPATIENT)
Age: 57
End: 2022-12-25

## 2023-04-16 ENCOUNTER — HEALTH MAINTENANCE LETTER (OUTPATIENT)
Age: 58
End: 2023-04-16

## 2023-09-17 ENCOUNTER — HEALTH MAINTENANCE LETTER (OUTPATIENT)
Age: 58
End: 2023-09-17

## 2024-02-04 ENCOUNTER — HEALTH MAINTENANCE LETTER (OUTPATIENT)
Age: 59
End: 2024-02-04

## 2024-06-23 ENCOUNTER — HEALTH MAINTENANCE LETTER (OUTPATIENT)
Age: 59
End: 2024-06-23